# Patient Record
Sex: FEMALE | Race: OTHER | URBAN - METROPOLITAN AREA
[De-identification: names, ages, dates, MRNs, and addresses within clinical notes are randomized per-mention and may not be internally consistent; named-entity substitution may affect disease eponyms.]

---

## 2024-03-06 LAB
ABO, EXTERNAL RESULT: NORMAL
C. TRACHOMATIS, EXTERNAL RESULT: NEGATIVE
HEP B, EXTERNAL RESULT: NEGATIVE
HEPATITIS C ANTIBODY, EXTERNAL RESULT: NORMAL
HIV, EXTERNAL RESULT: NORMAL
N. GONORRHOEAE, EXTERNAL RESULT: NEGATIVE
RH FACTOR, EXTERNAL RESULT: POSITIVE
RPR, EXTERNAL RESULT: NORMAL
RUBELLA TITER, EXTERNAL RESULT: NORMAL

## 2024-05-03 ENCOUNTER — ROUTINE PRENATAL (OUTPATIENT)
Age: 39
End: 2024-05-03
Payer: MEDICAID

## 2024-05-03 VITALS — SYSTOLIC BLOOD PRESSURE: 127 MMHG | DIASTOLIC BLOOD PRESSURE: 76 MMHG | HEART RATE: 84 BPM

## 2024-05-03 DIAGNOSIS — O09.523 AMA (ADVANCED MATERNAL AGE) MULTIGRAVIDA 35+, THIRD TRIMESTER: Primary | ICD-10-CM

## 2024-05-03 DIAGNOSIS — Z3A.34 34 WEEKS GESTATION OF PREGNANCY: ICD-10-CM

## 2024-05-03 DIAGNOSIS — O40.3XX0 POLYHYDRAMNIOS AFFECTING PREGNANCY IN THIRD TRIMESTER: ICD-10-CM

## 2024-05-03 PROCEDURE — 76819 FETAL BIOPHYS PROFIL W/O NST: CPT | Performed by: OBSTETRICS & GYNECOLOGY

## 2024-05-03 PROCEDURE — 99024 POSTOP FOLLOW-UP VISIT: CPT | Performed by: OBSTETRICS & GYNECOLOGY

## 2024-05-03 NOTE — PROCEDURES
PATIENT: STACY ALDRIDGE   -  : 1985   -  DOS:2024   -  INTERPRETING PROVIDER:Palomo Arevalo,   Indication  ========    Advanced Maternal Age, Polyhydramnios. obesity I.    Method  ======    Transabdominal ultrasound examination. View: Sufficient    Pregnancy  =========    Casiano pregnancy. Number of fetuses: 1    Dating  ======    LMP on: 2023  Cycle: regular cycle  GA by LMP 31 w + 4 d  ENDER by LMP: 2024  Assigned: based on ultrasound (AC, BPD, Femur, HC), selected on 2024  Assigned GA 34 w + 2 d  Assigned ENDER: 2024    General Evaluation  ==============    Cardiac activity present.  bpm. Fetal movements: visualized. Presentation: Transverse, head to maternal left  Placenta: Placental site: anterior, appropriate distance from the internal os  Umbilical cord: Cord vessels: 3 vessel cord    Fetal Anatomy  ===========    Face  Mandible: SUBOPTIMAL  4-chamber view: SUBOPTIMAL  RVOT view: SUBOPTIMAL  Heart / Thorax  Ductal arch view: SUBOPTIMAL  Stomach: normal  Kidneys: normal  Bladder: normal  Lt arm: NOT VISUALIZED  Rt hand: NOT VISUALIZED  Rt fingers: NOT VISUALIZED  Lt hand: NOT VISUALIZED  Lt fingers: NOT VISUALIZED  Wants to know fetal sex: yes    Amniotic Fluid Assessment  =====================    Amount of AF: polyhydramnios  MVP 8.8 cm. EMIR 24.7 cm. Q1 7.9 cm, Q2 8.8 cm, Q3 3.7 cm, Q4 4.3 cm    Biophysical Profile  ==============    0: Fetal breathing movements  2: Gross body movements  2: Fetal tone  2: Amniotic fluid volume  NST: reactive  8/10 Biophysical profile score    Non Stress Test  =============    NST interpretation: reactive. Test duration 20 min. Baseline  bpm. Baseline variability: moderate. Accelerations: present. Decelerations: absent. Uterine activity:  absent. Acoustic stimulation: no    Findings  =======    Intrauterine Casiano pregnancy at 34w 2d by clinical dates.  Amniotic fluid is polyhydramnios.  Placenta is anterior, appropriate

## 2024-05-10 ENCOUNTER — ROUTINE PRENATAL (OUTPATIENT)
Age: 39
End: 2024-05-10
Payer: MEDICAID

## 2024-05-10 VITALS — SYSTOLIC BLOOD PRESSURE: 115 MMHG | HEART RATE: 68 BPM | DIASTOLIC BLOOD PRESSURE: 62 MMHG

## 2024-05-10 DIAGNOSIS — O40.3XX0 POLYHYDRAMNIOS, THIRD TRIMESTER, SINGLE GESTATION: ICD-10-CM

## 2024-05-10 DIAGNOSIS — Z75.8 LANGUAGE BARRIER: ICD-10-CM

## 2024-05-10 DIAGNOSIS — Z3A.35 35 WEEKS GESTATION OF PREGNANCY: ICD-10-CM

## 2024-05-10 DIAGNOSIS — O09.523 AMA (ADVANCED MATERNAL AGE) MULTIGRAVIDA 35+, THIRD TRIMESTER: Primary | ICD-10-CM

## 2024-05-10 DIAGNOSIS — O99.213 OBESITY AFFECTING PREGNANCY IN THIRD TRIMESTER, UNSPECIFIED OBESITY TYPE: ICD-10-CM

## 2024-05-10 DIAGNOSIS — Z60.3 LANGUAGE BARRIER: ICD-10-CM

## 2024-05-10 PROCEDURE — 99212 OFFICE O/P EST SF 10 MIN: CPT | Performed by: OBSTETRICS & GYNECOLOGY

## 2024-05-10 PROCEDURE — 76818 FETAL BIOPHYS PROFILE W/NST: CPT | Performed by: OBSTETRICS & GYNECOLOGY

## 2024-05-10 SDOH — SOCIAL STABILITY - SOCIAL INSECURITY: ACCULTURATION DIFFICULTY: Z60.3

## 2024-05-10 NOTE — PROCEDURES
leakage of fluid.    Impression:  Intrauterine pregnancy at 35 weeks and 2 days.  Advanced maternal age.  Maternal obesity.  Mild polyhydramnios.    Recommendations:  Return to your office for obstetrical care and delivery.  See recommendations below.    Patient expressed her understanding of the above. I answered all her questions.  Thank you for allowing us to share in this patient's care. If you have any questions or if I can be of any further assistance to you, please do not hesitate to call.  For this office visit/consultation I spent 10 minutes in the care of this patient.    Throughout this encounter the patient was not wearing a mask; I was wearing a mask. I maintained the recommended social distancing.    Due to the declared Covid-19 pandemic and other respiratory infections, additional supplies, precautions, screenings and staff time over and above those usually included in  an office visit were utilized and required to support the safe in-person provision of services and mitigate transmission of disease.    Parts of this note and ultrasound report were completed using Dragon medical speech recognition software. Grammatical errors, random word insertions, pronoun errors  and incomplete sentences are occasional consequences of voice recognition technology and other factors. If there are any questions or concerns about the content of this  note or information contained in this dictation, please let me know as soon as possible.    Recommendations  ==============    RECOMMENDATIONS:  Continue with weekly biophysical profiles.  GROWTH in 3-4 weeks.  Perform daily fetal kick and movement counts.    Coding  ======    Code: O09.523  Description: Supervision of elderly multigravida  Code: O40.1XXXX  Description: Polyhydramnios  Code: Z3A.35  Description: Weeks of gestation  Code: 61386  Description: Ultrasound, pregnant uterus, real time with image documentation, limited one or more

## 2024-05-17 ENCOUNTER — ROUTINE PRENATAL (OUTPATIENT)
Age: 39
End: 2024-05-17
Payer: COMMERCIAL

## 2024-05-17 VITALS — HEART RATE: 76 BPM | SYSTOLIC BLOOD PRESSURE: 115 MMHG | DIASTOLIC BLOOD PRESSURE: 69 MMHG

## 2024-05-17 DIAGNOSIS — O09.523 AMA (ADVANCED MATERNAL AGE) MULTIGRAVIDA 35+, THIRD TRIMESTER: Primary | ICD-10-CM

## 2024-05-17 PROCEDURE — 76819 FETAL BIOPHYS PROFIL W/O NST: CPT | Performed by: OBSTETRICS & GYNECOLOGY

## 2024-05-17 PROCEDURE — 76816 OB US FOLLOW-UP PER FETUS: CPT | Performed by: OBSTETRICS & GYNECOLOGY

## 2024-05-17 NOTE — PROGRESS NOTES
Verbal teaching on Fetal movement/kick counts, and Pre Eclampsia signs and symptoms provided. Patient verbalized understanding. Opportunity for questions and concerns provided. No additional questions at this time.

## 2024-05-20 NOTE — PROCEDURES
fetal sex: yes    Biophysical Profile  ==============    2: Fetal breathing movements  2: Gross body movements  2: Fetal tone  2: Amniotic fluid volume  8/8 Biophysical profile score    Findings  =======    Viable Casiano pregnancy at 36w 2d by clinical dates.  EFW is 2996 g at 63%, abdominal circumference at 95%.  Anatomy visualized as stated above.  Amniotic fluid is normal.  Placenta is anterior, appropriate distance from the internal os.  Transverse, head to maternal left presentation.    Biophysical profile score is 8/8.    Consultation  ==========    Talya is a 39 y/o  seen for the following:    AMA/Class I Obesity  - 4/3/24: 1 Hr Glucola 45  -Taking ld ASA  -LR NIPT  -Kick count instructions, PIH and PTL precautions reviewed.    Recommendations  ==============    Weekly  surveillance  Interval growth every 4 weeks as scheduled  Perform daily fetal kick and movement counts.    Coding  ======    Code: O09.523  Description: Supervision of elderly multigravida  Code: O40.1XXXX  Description: Polyhydramnios  Code: Z3A.36  Description: Weeks of gestation  Code: 07919  Description: Ultrasound, pregnant uterus, real time with image documentation, follow up, transabdominal approach per fetus  Code: 48310  Description: Fetal biophysical profile; without non-stress testing

## 2024-05-24 ENCOUNTER — ROUTINE PRENATAL (OUTPATIENT)
Age: 39
End: 2024-05-24
Payer: COMMERCIAL

## 2024-05-24 VITALS — HEART RATE: 81 BPM | SYSTOLIC BLOOD PRESSURE: 138 MMHG | DIASTOLIC BLOOD PRESSURE: 73 MMHG

## 2024-05-24 DIAGNOSIS — O32.2XX0 TRANSVERSE LIE OF FETUS, SINGLE OR UNSPECIFIED FETUS: ICD-10-CM

## 2024-05-24 DIAGNOSIS — Z3A.37 37 WEEKS GESTATION OF PREGNANCY: Primary | ICD-10-CM

## 2024-05-24 DIAGNOSIS — O09.523 ELDERLY MULTIGRAVIDA IN THIRD TRIMESTER: ICD-10-CM

## 2024-05-24 PROCEDURE — 76819 FETAL BIOPHYS PROFIL W/O NST: CPT | Performed by: OBSTETRICS & GYNECOLOGY

## 2024-05-24 PROCEDURE — 99024 POSTOP FOLLOW-UP VISIT: CPT | Performed by: OBSTETRICS & GYNECOLOGY

## 2024-05-24 NOTE — PROCEDURES
PATIENT: STACY ALDRIDGE   -  : 1985   -  DOS:2024   -  INTERPRETING PROVIDER:Palomo Arevalo,   Indication  ========    Advanced Maternal Age, obesity I    Method  ======    Transabdominal ultrasound examination. View: Sufficient    Pregnancy  =========    Casiano pregnancy. Number of fetuses: 1    Dating  ======    LMP on: 2023  Cycle: regular cycle  GA by LMP 34 w + 4 d  ENDER by LMP: 2024  Assigned: based on ultrasound (AC, BPD, Femur, HC), selected on 2024  Assigned GA 37 w + 2 d  Assigned ENDER: 2024    General Evaluation  ==============    Cardiac activity present.  bpm. Fetal movements: visualized. Presentation: Transverse, head to maternal left  Placenta: Placental site: anterior, appropriate distance from the internal os  Umbilical cord: Cord vessels: 3 vessel cord    Fetal Anatomy  ===========    Face  Mandible: SUBOPTIMAL  4-chamber view: SUBOPTIMAL  RVOT view: SUBOPTIMAL  Heart / Thorax  Ductal arch view: SUBOPTIMAL  Stomach: normal  Kidneys: normal  Bladder: normal  Lt arm: NOT VISUALIZED  Rt hand: NOT VISUALIZED  Rt fingers: NOT VISUALIZED  Lt hand: NOT VISUALIZED  Lt fingers: NOT VISUALIZED  Wants to know fetal sex: yes    Amniotic Fluid Assessment  =====================    Amount of AF: normal  MVP 6.9 cm. EMIR 16.3 cm. Q1 6.9 cm, Q2 3.4 cm, Q3 4.2 cm, Q4 1.9 cm    Biophysical Profile  ==============    2: Fetal breathing movements  2: Gross body movements  2: Fetal tone  2: Amniotic fluid volume  8/8 Biophysical profile score    Findings  =======    Intrauterine Casiano pregnancy at 37w 2d by clinical dates.  Amniotic fluid: normal.  Placenta is anterior, appropriate distance from the internal os.  Biophysical profile score is 8/8.  Transverse, head to maternal left presentation.    Consultation  ==========    Talya is a 39 y/o  seen for the following:    AMA/Class I Obesity  - 4/3/24: 1 Hr Glucola 45  -Taking LDASA  -LR NIPT  -Kick count

## 2024-05-29 ENCOUNTER — HOSPITAL ENCOUNTER (OUTPATIENT)
Facility: HOSPITAL | Age: 39
Discharge: HOME | End: 2024-05-29
Attending: OBSTETRICS & GYNECOLOGY | Admitting: OBSTETRICS & GYNECOLOGY
Payer: COMMERCIAL

## 2024-05-29 VITALS
DIASTOLIC BLOOD PRESSURE: 71 MMHG | HEART RATE: 81 BPM | OXYGEN SATURATION: 100 % | RESPIRATION RATE: 16 BRPM | TEMPERATURE: 98 F | SYSTOLIC BLOOD PRESSURE: 129 MMHG

## 2024-05-29 LAB
ABO + RH BLD: NORMAL
BLOOD GROUP ANTIBODIES SERPL: NORMAL
ERYTHROCYTE [DISTWIDTH] IN BLOOD BY AUTOMATED COUNT: 17.2 % (ref 11.5–14.5)
HCT VFR BLD AUTO: 39.5 % (ref 35–47)
HGB BLD-MCNC: 12.9 G/DL (ref 11.5–16)
MCH RBC QN AUTO: 27 PG (ref 26–34)
MCHC RBC AUTO-ENTMCNC: 32.7 G/DL (ref 30–36.5)
MCV RBC AUTO: 82.8 FL (ref 80–99)
NRBC # BLD: 0 K/UL (ref 0–0.01)
NRBC BLD-RTO: 0 PER 100 WBC
PLATELET # BLD AUTO: 323 K/UL (ref 150–400)
PMV BLD AUTO: 10.2 FL (ref 8.9–12.9)
RBC # BLD AUTO: 4.77 M/UL (ref 3.8–5.2)
RPR SER QL: NONREACTIVE
SPECIMEN EXP DATE BLD: NORMAL
WBC # BLD AUTO: 6.9 K/UL (ref 3.6–11)

## 2024-05-29 PROCEDURE — 36415 COLL VENOUS BLD VENIPUNCTURE: CPT

## 2024-05-29 PROCEDURE — 86900 BLOOD TYPING SEROLOGIC ABO: CPT

## 2024-05-29 PROCEDURE — 1100000000 HC RM PRIVATE

## 2024-05-29 PROCEDURE — 86592 SYPHILIS TEST NON-TREP QUAL: CPT

## 2024-05-29 PROCEDURE — 86901 BLOOD TYPING SEROLOGIC RH(D): CPT

## 2024-05-29 PROCEDURE — 86850 RBC ANTIBODY SCREEN: CPT

## 2024-05-29 PROCEDURE — 85027 COMPLETE CBC AUTOMATED: CPT

## 2024-05-29 RX ORDER — TERBUTALINE SULFATE 1 MG/ML
0.25 INJECTION, SOLUTION SUBCUTANEOUS ONCE
Status: DISCONTINUED | OUTPATIENT
Start: 2024-05-29 | End: 2024-05-29 | Stop reason: HOSPADM

## 2024-05-29 NOTE — H&P
History & Physical    Name: Talya Hayes MRN: [id number]  SSN: [id number]    YOB: 1985  Age: 39 y.o.  Sex: female      Subjective:     Reason for Admission:  Pregnancy and Breech    History of Present Illness: Talya Hayes is a 39 y.o.  female with an estimated gestational age of 38w0d with Estimated Date of Delivery: 24. Patient complains of  Breech Presentation  . Pregnancy has been complicated by advanced maternal age, polyhydramnios, and Breech Presentation .  Patient denies abdominal pain  , chest pain, contractions, fever, headache , nausea and vomiting, pelvic pressure, right upper quadrant pain  , shortness of breath, swelling, vaginal bleeding , vaginal leaking of fluid , and visual disturbances.    OB History          3    Para   1    Term   1            AB   1    Living   1         SAB   1    IAB        Ectopic        Molar        Multiple        Live Births   1              Past Medical History:   Diagnosis Date    AMA (advanced maternal age) multigravida 35+     Polyhydramnios      No past surgical history on file.  Social History     Occupational History    Not on file   Tobacco Use    Smoking status: Never     Passive exposure: Never    Smokeless tobacco: Never   Substance and Sexual Activity    Alcohol use: Not Currently    Drug use: Not on file    Sexual activity: Not on file     No family history on file.    No Known Allergies  Prior to Admission medications    Medication Sig Start Date End Date Taking? Authorizing Provider   FEROSUL 325 (65 Fe) MG tablet Take 1 tablet by mouth daily 3/12/24   Phil Bearden MD   Prenatal Vit w/Sf-Qetitytvb-ON (PNV PO) Take by mouth    Phil Bearden MD   aspirin 81 MG EC tablet Take 1 tablet by mouth daily    Phil Bearden MD        Review of Systems  No contractions  No Loss of amniotic fluid  No Vaginal Bleeding  Positive Fetal Movements    Objective:     Vitals:    Vitals:

## 2024-05-29 NOTE — CONSULTS
1040-pt arrived for scheduled version. Pt endorses +fetal movement and no leaking of fluid or bleeding.    1155-Dr Horowitz at bedside    1202-US confirmed vertex, plan to dc and follow up in cross over clinic at scheduled. All questions answered and dc instructions given    Session ID: [id number]  Language: Tamazight   ID: #[id number]   Name: Alba

## 2024-05-29 NOTE — CONSULTS
1040-pt arrived for scheduled version. Pt endorses +fetal movement and no leaking of fluid or bleeding.    1155-Dr Horowitz at bedside    1202-US confirmed vertex, plan to dc and follow up in cross over clinic at scheduled. All questions answered and dc instructions given      Session ID: [id number]  Language: Japanese   ID: #[id number]   Name: Alba

## 2024-05-31 ENCOUNTER — ROUTINE PRENATAL (OUTPATIENT)
Age: 39
End: 2024-05-31
Payer: COMMERCIAL

## 2024-05-31 VITALS — DIASTOLIC BLOOD PRESSURE: 81 MMHG | SYSTOLIC BLOOD PRESSURE: 139 MMHG | HEART RATE: 51 BPM

## 2024-05-31 DIAGNOSIS — O09.523 ELDERLY MULTIGRAVIDA IN THIRD TRIMESTER: Primary | ICD-10-CM

## 2024-05-31 PROCEDURE — 99212 OFFICE O/P EST SF 10 MIN: CPT | Performed by: OBSTETRICS & GYNECOLOGY

## 2024-05-31 PROCEDURE — 76819 FETAL BIOPHYS PROFIL W/O NST: CPT | Performed by: OBSTETRICS & GYNECOLOGY

## 2024-05-31 NOTE — PROCEDURES
to ob who can perform ECV vs c/s    2) AMA (age 39)/Class I Obesity  - 4/3/24: 1 Hr Glucola 45  -Taking LDASA  -LR NIPT  -Kick count instructions, PIH and PTL precautions reviewed.    Patient was counseled on the findings. We discussed my recommendation to deliver at 39 weeks. Questions and concerns were addressed.    Excluding time reading the ultrasound, a total of 15 minutes was spent on this visit reviewing previous notes, counseling the patient and documenting the findings in the  note.    Recommendations  ==============    ECV vs c/s at 39 weeks    Coding  ======    Code: O09.523  Description: Supervision of elderly multigravida  Code: O32.2XX0  Description: Maternal care for transverse and oblique lie  Code: Z3A.38  Description: Weeks of gestation  Code: 38021  Description: Fetal biophysical profile; without non-stress testing

## 2024-06-07 ENCOUNTER — HOSPITAL ENCOUNTER (INPATIENT)
Facility: HOSPITAL | Age: 39
LOS: 2 days | Discharge: HOME | DRG: 540 | End: 2024-06-09
Attending: FAMILY MEDICINE | Admitting: FAMILY MEDICINE
Payer: COMMERCIAL

## 2024-06-07 ENCOUNTER — ANESTHESIA EVENT (OUTPATIENT)
Facility: HOSPITAL | Age: 39
End: 2024-06-07
Payer: COMMERCIAL

## 2024-06-07 ENCOUNTER — ANESTHESIA (OUTPATIENT)
Facility: HOSPITAL | Age: 39
End: 2024-06-07
Payer: COMMERCIAL

## 2024-06-07 PROBLEM — Z3A.39 39 WEEKS GESTATION OF PREGNANCY: Status: ACTIVE | Noted: 2024-06-07

## 2024-06-07 LAB
ABO + RH BLD: NORMAL
BLOOD GROUP ANTIBODIES SERPL: NORMAL
ERYTHROCYTE [DISTWIDTH] IN BLOOD BY AUTOMATED COUNT: 16.5 % (ref 11.5–14.5)
HCT VFR BLD AUTO: 37.4 % (ref 35–47)
HGB BLD-MCNC: 12.2 G/DL (ref 11.5–16)
MCH RBC QN AUTO: 27.1 PG (ref 26–34)
MCHC RBC AUTO-ENTMCNC: 32.6 G/DL (ref 30–36.5)
MCV RBC AUTO: 83.1 FL (ref 80–99)
NRBC # BLD: 0 K/UL (ref 0–0.01)
NRBC BLD-RTO: 0 PER 100 WBC
PLATELET # BLD AUTO: 300 K/UL (ref 150–400)
PMV BLD AUTO: 10.4 FL (ref 8.9–12.9)
RBC # BLD AUTO: 4.5 M/UL (ref 3.8–5.2)
SPECIMEN EXP DATE BLD: NORMAL
WBC # BLD AUTO: 7.7 K/UL (ref 3.6–11)

## 2024-06-07 PROCEDURE — 2500000003 HC RX 250 WO HCPCS: Performed by: NURSE ANESTHETIST, CERTIFIED REGISTERED

## 2024-06-07 PROCEDURE — 6360000002 HC RX W HCPCS: Performed by: NURSE ANESTHETIST, CERTIFIED REGISTERED

## 2024-06-07 PROCEDURE — 2580000003 HC RX 258

## 2024-06-07 PROCEDURE — 3609079900 HC CESAREAN SECTION: Performed by: OBSTETRICS & GYNECOLOGY

## 2024-06-07 PROCEDURE — 1120000000 HC RM PRIVATE OB

## 2024-06-07 PROCEDURE — 2709999900 HC NON-CHARGEABLE SUPPLY: Performed by: OBSTETRICS & GYNECOLOGY

## 2024-06-07 PROCEDURE — 86900 BLOOD TYPING SEROLOGIC ABO: CPT

## 2024-06-07 PROCEDURE — 4A1HXCZ MONITORING OF PRODUCTS OF CONCEPTION, CARDIAC RATE, EXTERNAL APPROACH: ICD-10-PCS | Performed by: OBSTETRICS & GYNECOLOGY

## 2024-06-07 PROCEDURE — 36415 COLL VENOUS BLD VENIPUNCTURE: CPT

## 2024-06-07 PROCEDURE — 59514 CESAREAN DELIVERY ONLY: CPT | Performed by: OBSTETRICS & GYNECOLOGY

## 2024-06-07 PROCEDURE — 3700000001 HC ADD 15 MINUTES (ANESTHESIA): Performed by: OBSTETRICS & GYNECOLOGY

## 2024-06-07 PROCEDURE — 3700000000 HC ANESTHESIA ATTENDED CARE: Performed by: OBSTETRICS & GYNECOLOGY

## 2024-06-07 PROCEDURE — 6360000002 HC RX W HCPCS

## 2024-06-07 PROCEDURE — 88307 TISSUE EXAM BY PATHOLOGIST: CPT

## 2024-06-07 PROCEDURE — 7100000000 HC PACU RECOVERY - FIRST 15 MIN: Performed by: OBSTETRICS & GYNECOLOGY

## 2024-06-07 PROCEDURE — 85027 COMPLETE CBC AUTOMATED: CPT

## 2024-06-07 PROCEDURE — 86901 BLOOD TYPING SEROLOGIC RH(D): CPT

## 2024-06-07 PROCEDURE — 7100000001 HC PACU RECOVERY - ADDTL 15 MIN: Performed by: OBSTETRICS & GYNECOLOGY

## 2024-06-07 PROCEDURE — 86780 TREPONEMA PALLIDUM: CPT

## 2024-06-07 PROCEDURE — 86850 RBC ANTIBODY SCREEN: CPT

## 2024-06-07 RX ORDER — SODIUM CHLORIDE 0.9 % (FLUSH) 0.9 %
5-40 SYRINGE (ML) INJECTION PRN
Status: DISCONTINUED | OUTPATIENT
Start: 2024-06-07 | End: 2024-06-09 | Stop reason: HOSPADM

## 2024-06-07 RX ORDER — SODIUM CHLORIDE 9 MG/ML
INJECTION, SOLUTION INTRAVENOUS PRN
Status: DISCONTINUED | OUTPATIENT
Start: 2024-06-07 | End: 2024-06-07

## 2024-06-07 RX ORDER — SODIUM CHLORIDE, SODIUM LACTATE, POTASSIUM CHLORIDE, AND CALCIUM CHLORIDE .6; .31; .03; .02 G/100ML; G/100ML; G/100ML; G/100ML
1000 INJECTION, SOLUTION INTRAVENOUS ONCE
Status: DISCONTINUED | OUTPATIENT
Start: 2024-06-07 | End: 2024-06-09 | Stop reason: HOSPADM

## 2024-06-07 RX ORDER — FAMOTIDINE 10 MG/ML
INJECTION, SOLUTION INTRAVENOUS PRN
Status: DISCONTINUED | OUTPATIENT
Start: 2024-06-07 | End: 2024-06-07 | Stop reason: SDUPTHER

## 2024-06-07 RX ORDER — OXYCODONE HYDROCHLORIDE 5 MG/1
5 TABLET ORAL EVERY 4 HOURS PRN
Status: DISCONTINUED | OUTPATIENT
Start: 2024-06-07 | End: 2024-06-09 | Stop reason: HOSPADM

## 2024-06-07 RX ORDER — BUPIVACAINE HYDROCHLORIDE 7.5 MG/ML
INJECTION, SOLUTION EPIDURAL; RETROBULBAR PRN
Status: DISCONTINUED | OUTPATIENT
Start: 2024-06-07 | End: 2024-06-07 | Stop reason: SDUPTHER

## 2024-06-07 RX ORDER — SODIUM CHLORIDE 9 MG/ML
INJECTION, SOLUTION INTRAVENOUS PRN
Status: DISCONTINUED | OUTPATIENT
Start: 2024-06-07 | End: 2024-06-09 | Stop reason: HOSPADM

## 2024-06-07 RX ORDER — OXYTOCIN 10 [USP'U]/ML
INJECTION, SOLUTION INTRAMUSCULAR; INTRAVENOUS PRN
Status: DISCONTINUED | OUTPATIENT
Start: 2024-06-07 | End: 2024-06-07 | Stop reason: SDUPTHER

## 2024-06-07 RX ORDER — KETOROLAC TROMETHAMINE 30 MG/ML
15 INJECTION, SOLUTION INTRAMUSCULAR; INTRAVENOUS EVERY 6 HOURS PRN
Status: DISCONTINUED | OUTPATIENT
Start: 2024-06-07 | End: 2024-06-07

## 2024-06-07 RX ORDER — ACETAMINOPHEN 500 MG
1000 TABLET ORAL EVERY 8 HOURS
Status: DISCONTINUED | OUTPATIENT
Start: 2024-06-07 | End: 2024-06-09 | Stop reason: HOSPADM

## 2024-06-07 RX ORDER — FERROUS SULFATE 325(65) MG
325 TABLET ORAL EVERY OTHER DAY
Status: DISCONTINUED | OUTPATIENT
Start: 2024-06-07 | End: 2024-06-09 | Stop reason: HOSPADM

## 2024-06-07 RX ORDER — SWAB
1 SWAB, NON-MEDICATED MISCELLANEOUS DAILY
Status: DISCONTINUED | OUTPATIENT
Start: 2024-06-07 | End: 2024-06-09 | Stop reason: HOSPADM

## 2024-06-07 RX ORDER — ONDANSETRON 2 MG/ML
INJECTION INTRAMUSCULAR; INTRAVENOUS PRN
Status: DISCONTINUED | OUTPATIENT
Start: 2024-06-07 | End: 2024-06-07 | Stop reason: SDUPTHER

## 2024-06-07 RX ORDER — SODIUM CHLORIDE, SODIUM LACTATE, POTASSIUM CHLORIDE, CALCIUM CHLORIDE 600; 310; 30; 20 MG/100ML; MG/100ML; MG/100ML; MG/100ML
INJECTION, SOLUTION INTRAVENOUS CONTINUOUS
Status: DISCONTINUED | OUTPATIENT
Start: 2024-06-07 | End: 2024-06-09 | Stop reason: HOSPADM

## 2024-06-07 RX ORDER — IBUPROFEN 800 MG/1
800 TABLET ORAL EVERY 8 HOURS
Status: DISCONTINUED | OUTPATIENT
Start: 2024-06-07 | End: 2024-06-09 | Stop reason: HOSPADM

## 2024-06-07 RX ORDER — SODIUM CHLORIDE 0.9 % (FLUSH) 0.9 %
10 SYRINGE (ML) INJECTION PRN
Status: DISCONTINUED | OUTPATIENT
Start: 2024-06-07 | End: 2024-06-07

## 2024-06-07 RX ORDER — KETOROLAC TROMETHAMINE 30 MG/ML
30 INJECTION, SOLUTION INTRAMUSCULAR; INTRAVENOUS EVERY 6 HOURS PRN
Status: DISCONTINUED | OUTPATIENT
Start: 2024-06-07 | End: 2024-06-09 | Stop reason: HOSPADM

## 2024-06-07 RX ORDER — SODIUM CHLORIDE 0.9 % (FLUSH) 0.9 %
5-40 SYRINGE (ML) INJECTION EVERY 12 HOURS SCHEDULED
Status: DISCONTINUED | OUTPATIENT
Start: 2024-06-07 | End: 2024-06-07

## 2024-06-07 RX ORDER — DOCUSATE SODIUM 100 MG/1
100 CAPSULE, LIQUID FILLED ORAL 2 TIMES DAILY
Status: DISCONTINUED | OUTPATIENT
Start: 2024-06-07 | End: 2024-06-09 | Stop reason: HOSPADM

## 2024-06-07 RX ORDER — MORPHINE SULFATE 1 MG/ML
INJECTION, SOLUTION EPIDURAL; INTRATHECAL; INTRAVENOUS PRN
Status: DISCONTINUED | OUTPATIENT
Start: 2024-06-07 | End: 2024-06-07 | Stop reason: SDUPTHER

## 2024-06-07 RX ORDER — ONDANSETRON 2 MG/ML
4 INJECTION INTRAMUSCULAR; INTRAVENOUS EVERY 6 HOURS PRN
Status: DISCONTINUED | OUTPATIENT
Start: 2024-06-07 | End: 2024-06-07

## 2024-06-07 RX ORDER — PROCHLORPERAZINE EDISYLATE 5 MG/ML
5 INJECTION INTRAMUSCULAR; INTRAVENOUS EVERY 6 HOURS PRN
Status: DISCONTINUED | OUTPATIENT
Start: 2024-06-07 | End: 2024-06-09 | Stop reason: HOSPADM

## 2024-06-07 RX ORDER — EPHEDRINE SULFATE/0.9% NACL/PF 25 MG/5 ML
SYRINGE (ML) INTRAVENOUS PRN
Status: DISCONTINUED | OUTPATIENT
Start: 2024-06-07 | End: 2024-06-07 | Stop reason: SDUPTHER

## 2024-06-07 RX ORDER — ACETAMINOPHEN 325 MG/1
975 TABLET ORAL ONCE
Status: DISCONTINUED | OUTPATIENT
Start: 2024-06-07 | End: 2024-06-07

## 2024-06-07 RX ORDER — PROCHLORPERAZINE EDISYLATE 5 MG/ML
10 INJECTION INTRAMUSCULAR; INTRAVENOUS EVERY 6 HOURS PRN
Status: DISCONTINUED | OUTPATIENT
Start: 2024-06-07 | End: 2024-06-07

## 2024-06-07 RX ORDER — LANOLIN/MINERAL OIL
LOTION (ML) TOPICAL
Status: DISCONTINUED | OUTPATIENT
Start: 2024-06-07 | End: 2024-06-09 | Stop reason: HOSPADM

## 2024-06-07 RX ORDER — SODIUM CHLORIDE 0.9 % (FLUSH) 0.9 %
5-40 SYRINGE (ML) INJECTION EVERY 12 HOURS SCHEDULED
Status: DISCONTINUED | OUTPATIENT
Start: 2024-06-07 | End: 2024-06-09 | Stop reason: HOSPADM

## 2024-06-07 RX ADMIN — SODIUM CHLORIDE, POTASSIUM CHLORIDE, SODIUM LACTATE AND CALCIUM CHLORIDE: 600; 310; 30; 20 INJECTION, SOLUTION INTRAVENOUS at 17:17

## 2024-06-07 RX ADMIN — BUPIVACAINE HYDROCHLORIDE 1 ML: 7.5 INJECTION, SOLUTION EPIDURAL; RETROBULBAR at 14:29

## 2024-06-07 RX ADMIN — OXYTOCIN 30 UNITS: 10 INJECTION INTRAVENOUS at 14:55

## 2024-06-07 RX ADMIN — FAMOTIDINE 20 MG: 10 INJECTION, SOLUTION INTRAVENOUS at 14:24

## 2024-06-07 RX ADMIN — KETOROLAC TROMETHAMINE 30 MG: 30 INJECTION, SOLUTION INTRAMUSCULAR at 20:56

## 2024-06-07 RX ADMIN — MORPHINE SULFATE 0.3 MG: 1 INJECTION, SOLUTION EPIDURAL; INTRATHECAL; INTRAVENOUS at 14:29

## 2024-06-07 RX ADMIN — ONDANSETRON 4 MG: 2 INJECTION INTRAMUSCULAR; INTRAVENOUS at 14:24

## 2024-06-07 RX ADMIN — CEFAZOLIN SODIUM 2000 MG: 1 POWDER, FOR SOLUTION INTRAMUSCULAR; INTRAVENOUS at 14:35

## 2024-06-07 RX ADMIN — EPHEDRINE SULFATE 12.5 MG: 5 INJECTION INTRAVENOUS at 14:39

## 2024-06-07 RX ADMIN — PROCHLORPERAZINE EDISYLATE 5 MG: 5 INJECTION INTRAMUSCULAR; INTRAVENOUS at 16:50

## 2024-06-07 RX ADMIN — SODIUM CHLORIDE, POTASSIUM CHLORIDE, SODIUM LACTATE AND CALCIUM CHLORIDE: 600; 310; 30; 20 INJECTION, SOLUTION INTRAVENOUS at 18:23

## 2024-06-07 RX ADMIN — EPHEDRINE SULFATE 12.5 MG: 5 INJECTION INTRAVENOUS at 15:08

## 2024-06-07 RX ADMIN — EPHEDRINE SULFATE 12.5 MG: 5 INJECTION INTRAVENOUS at 14:43

## 2024-06-07 RX ADMIN — SODIUM CHLORIDE, POTASSIUM CHLORIDE, SODIUM LACTATE AND CALCIUM CHLORIDE: 600; 310; 30; 20 INJECTION, SOLUTION INTRAVENOUS at 14:57

## 2024-06-07 RX ADMIN — SODIUM CHLORIDE, POTASSIUM CHLORIDE, SODIUM LACTATE AND CALCIUM CHLORIDE: 600; 310; 30; 20 INJECTION, SOLUTION INTRAVENOUS at 14:20

## 2024-06-07 ASSESSMENT — PAIN DESCRIPTION - DESCRIPTORS: DESCRIPTORS: ACHING;SORE

## 2024-06-07 ASSESSMENT — PAIN SCALES - GENERAL: PAINLEVEL_OUTOF10: 5

## 2024-06-07 ASSESSMENT — PAIN DESCRIPTION - LOCATION: LOCATION: ABDOMEN;INCISION

## 2024-06-07 ASSESSMENT — PAIN - FUNCTIONAL ASSESSMENT: PAIN_FUNCTIONAL_ASSESSMENT: ACTIVITIES ARE NOT PREVENTED

## 2024-06-07 ASSESSMENT — PAIN DESCRIPTION - ORIENTATION: ORIENTATION: LOWER

## 2024-06-07 NOTE — PROGRESS NOTES
[id number] Patient arrived on unit for 12pm scheduled c/s.   services used for admission and assessment.  Denies LOF or vaginal bleeding.  EFM applied.Endorses +FM. IV started, labs drawn.  Consents obtained.  This  delayed due to urgent c/s.  Patient updated.  Will continue to monitor.

## 2024-06-07 NOTE — L&D DELIVERY NOTE
Raheem Taylor [[id number]]      Labor Events     Labor: No   Steroids: None  Cervical Ripening Date/Time:      Antibiotics Received during Labor: No  Rupture Identifier: Sac 1  Rupture Date/Time:  24 14:54:00   Rupture Type: Intact, AROM  Fluid Color: Clear  Fluid Odor: None  Fluid Volume: Large  Induction: None  Augmentation: None  Labor Complications: None              Anesthesia    Method: Spinal       Labor Length    3rd stage: 0h 02m       Delivery Details      Delivery Date: 24 Delivery Time: 14:54:00   Delivery Type: , Low Transverse  Trial of Labor?: No   Categorization: Primary   Priority: scheduled  Indications for : Breech       Skin Incision Type: Transverse  Uterine Incision: Low Transverse       [place] Presentation    Presentation: Breech       Shoulder Dystocia    Shoulder Dystocia Present?: No       Assisted Delivery Details    Forceps Attempted?: No  Vacuum Extractor Attempted?: No                           Cord    Vessels: 3 Vessels  Complications: None  Delayed Cord Clamping?: Yes  Cord Clamped Date/Time: 2024 14:54:00  Cord Blood Disposition: Lab  Gases Sent?: No              Placenta    Date/Time: 2024 14:56:00  Removal: Expressed  Appearance: Abnormal  Disposition: Lab       Lacerations    Episiotomy: None  Perineal Lacerations: None  Other Lacerations: no non-perineal laceration       Blood Loss  Mother: Talya Taylor R #[id number]     Start of Mother's Information      Delivery Blood Loss  24 1422 - 24 1542      Quantitative Blood Loss (mL) Hospital Encounter 840 grams    Total  840 mL               End of Mother's Information  Mother: Talya Taylor R #[id number]                Delivery Providers    Delivering clinician: Ivonne Galicia MD     Provider Role    Ivonne Galicia MD Obstetrician    Rosette Alcaraz RN Primary Nurse    Manuela Latif RN Primary  Nurse    Lawrence Mansfield

## 2024-06-07 NOTE — ANESTHESIA PROCEDURE NOTES
Spinal Block    End time: 6/7/2024 12:30 PM  Reason for block: primary anesthetic  Staffing  Performed: resident/CRNA   Anesthesiologist: Anirudh Pimentel MD  Resident/CRNA: Lawrence Mansfield APRN - CRNA  Performed by: Lawrence Mansfield APRN - CRNA  Authorized by: Anirudh Pimentel MD    Spinal Block  Patient position: sitting  Prep: DuraPrep  Patient monitoring: cardiac monitor, continuous pulse ox, frequent blood pressure checks and oxygen  Approach: midline  Location: L3/L4  Provider prep: mask and sterile gloves  Local infiltration: lidocaine  Needle  Needle type: pencil-tip   Needle gauge: 25 G  Needle length: 3.5 in  Assessment  Swirl obtained: Yes  CSF: clear  Attempts: 1  Hemodynamics: stable  Preanesthetic Checklist  Completed: patient identified, IV checked, site marked, risks and benefits discussed, surgical/procedural consents, equipment checked, pre-op evaluation, timeout performed, anesthesia consent given, oxygen available, monitors applied/VS acknowledged, fire risk safety assessment completed and verbalized and blood product R/B/A discussed and consented

## 2024-06-07 NOTE — H&P
Noted EMIR 29.2cm on 4/26. EMIR 13.9 on most recent MFM scan 5/31.    AMA/Obesity: Passed 1hr GTT. On ASA throughout pregnancy.    Hx of miscarriage: second gestation, at 8 weeks. Had D&C.     Patient will be discussed with Dr. Galicia (OB-GYN attending)    Kaylee Abreu MD  Family Medicine Resident

## 2024-06-07 NOTE — OP NOTE
Operative Note    Name: Talya Hayes   Medical Record Number: [id number]   YOB: 1985  Today's Date: 2024      Pre-operative Diagnosis:breech presentation 39 weeks    Post-operative Diagnosis: TLBMI    Operation: low transverse  section Procedure(s):   SECTION    Surgeon: Ivonne Galicia MD    Anesthesia: Spinal    EBL: 750cc    Prophylactic Antibiotics: Ancef  DVT Prophylaxis: Sequential Compression Devices         Fetal Description: joshi     Birth Information:   Information for the patient's :  Raheem Taylor [[id number]]   @[id number]@     Umbilical Cord: 3 vessels present    Placenta:  manual removal    Specimens: placenta           Complications:  none    Implants: none    Surgical Assistant: Robles Mckinney  Scrub Person First: Devi Jorgensen      Procedure Detail:      After proper patient identification and consent, the patient was taken to the operating room, where spinal anesthesia was administered and found to be adequate. Hancock catheter had been placed using sterile technique.  The patient was prepped and draped in the normal sterile fashion.The abdomen was entered using the Pfannenstiel technique. The peritoneum was entered bluntely well superior to the bladder without any apparent injury. An Kenneth retractor was placed.  Palpation revealed no bowel below the retractor. The bladder flap was created without difficulty. A low transverse uterine incision was made with the scalpel and extended with blunt finger dissection. Amniotomy was performed and the fluid was medium amount clear.  The baby was footling breech. The feet were grasped and the baby delivered by breech extraction.  The nose and mouth were suctioned. The cord was clamped and cut and the baby was handed off to Nursing staff in attendance. The placenta was manually extracted. The uterus was wiped clean with a moist lap pad and cleared of all clots and debris. The

## 2024-06-07 NOTE — ANESTHESIA PRE PROCEDURE
BMI:   Wt Readings from Last 3 Encounters:   03/19/24 66.2 kg (145 lb 14.4 oz)     There is no height or weight on file to calculate BMI.    CBC:   Lab Results   Component Value Date/Time    WBC 7.7 06/07/2024 10:46 AM    RBC 4.50 06/07/2024 10:46 AM    HGB 12.2 06/07/2024 10:46 AM    HCT 37.4 06/07/2024 10:46 AM    MCV 83.1 06/07/2024 10:46 AM    RDW 16.5 06/07/2024 10:46 AM     06/07/2024 10:46 AM       CMP: No results found for: \"NA\", \"K\", \"CL\", \"CO2\", \"BUN\", \"CREATININE\", \"GFRAA\", \"AGRATIO\", \"LABGLOM\", \"GLUCOSE\", \"GLU\", \"PROT\", \"CALCIUM\", \"BILITOT\", \"ALKPHOS\", \"AST\", \"ALT\"    POC Tests: No results for input(s): \"POCGLU\", \"POCNA\", \"POCK\", \"POCCL\", \"POCBUN\", \"POCHEMO\", \"POCHCT\" in the last 72 hours.    Coags: No results found for: \"PROTIME\", \"INR\", \"APTT\"    HCG (If Applicable): No results found for: \"PREGTESTUR\", \"PREGSERUM\", \"HCG\", \"HCGQUANT\"     ABGs: No results found for: \"PHART\", \"PO2ART\", \"INT9WWB\", \"ZQT3MBU\", \"BEART\", \"U4EETMLU\"     Type & Screen (If Applicable):  No results found for: \"LABABO\"    Drug/Infectious Status (If Applicable):  No results found for: \"HIV\", \"HEPCAB\"    COVID-19 Screening (If Applicable): No results found for: \"COVID19\"        Anesthesia Evaluation  Patient summary reviewed and Nursing notes reviewed  Airway: Mallampati: II  TM distance: >3 FB   Neck ROM: full  Mouth opening: > = 3 FB   Dental: normal exam         Pulmonary:Negative Pulmonary ROS and normal exam  breath sounds clear to auscultation                             Cardiovascular:Negative CV ROS  Exercise tolerance: good (>4 METS)          Rhythm: regular  Rate: normal                    Neuro/Psych:   Negative Neuro/Psych ROS              GI/Hepatic/Renal: Neg GI/Hepatic/Renal ROS            Endo/Other: Negative Endo/Other ROS                    Abdominal:             Vascular: negative vascular ROS.         Other Findings:             Anesthesia

## 2024-06-07 NOTE — ANESTHESIA POSTPROCEDURE EVALUATION
Department of Anesthesiology  Postprocedure Note    Patient: Talya Hayes  MRN: [id number]  YOB: 1985  Date of evaluation: 2024    Procedure Summary       Date: 24 Room / Location: [facility] L&D  [facility] L&D OR    Anesthesia Start: 1422 Anesthesia Stop: 152    Procedure:  SECTION (Abdomen) Diagnosis:       Spontaneous breech delivery, single or unspecified fetus      (Spontaneous breech delivery, single or unspecified fetus [O32.1XX0])    Surgeons: Ivonne Gailcia MD Responsible Provider: Anirudh Pimentel MD    Anesthesia Type: Spinal ASA Status: 2            Anesthesia Type: Spinal    Maris Phase I: Maris Score: 9    Maris Phase II:      Anesthesia Post Evaluation    No notable events documented.

## 2024-06-08 LAB
ERYTHROCYTE [DISTWIDTH] IN BLOOD BY AUTOMATED COUNT: 15.9 % (ref 11.5–14.5)
HCT VFR BLD AUTO: 31.3 % (ref 35–47)
HGB BLD-MCNC: 10.4 G/DL (ref 11.5–16)
MCH RBC QN AUTO: 27.4 PG (ref 26–34)
MCHC RBC AUTO-ENTMCNC: 33.2 G/DL (ref 30–36.5)
MCV RBC AUTO: 82.6 FL (ref 80–99)
NRBC # BLD: 0 K/UL (ref 0–0.01)
NRBC BLD-RTO: 0 PER 100 WBC
PLATELET # BLD AUTO: 301 K/UL (ref 150–400)
PMV BLD AUTO: 10.2 FL (ref 8.9–12.9)
RBC # BLD AUTO: 3.79 M/UL (ref 3.8–5.2)
WBC # BLD AUTO: 8.5 K/UL (ref 3.6–11)

## 2024-06-08 PROCEDURE — 1120000000 HC RM PRIVATE OB

## 2024-06-08 PROCEDURE — 94761 N-INVAS EAR/PLS OXIMETRY MLT: CPT

## 2024-06-08 PROCEDURE — 6360000002 HC RX W HCPCS

## 2024-06-08 PROCEDURE — 36415 COLL VENOUS BLD VENIPUNCTURE: CPT

## 2024-06-08 PROCEDURE — 6370000000 HC RX 637 (ALT 250 FOR IP)

## 2024-06-08 PROCEDURE — 99232 SBSQ HOSP IP/OBS MODERATE 35: CPT | Performed by: FAMILY MEDICINE

## 2024-06-08 PROCEDURE — 85027 COMPLETE CBC AUTOMATED: CPT

## 2024-06-08 RX ADMIN — IBUPROFEN 800 MG: 800 TABLET, FILM COATED ORAL at 20:53

## 2024-06-08 RX ADMIN — KETOROLAC TROMETHAMINE 30 MG: 30 INJECTION, SOLUTION INTRAMUSCULAR at 03:49

## 2024-06-08 RX ADMIN — DOCUSATE SODIUM 100 MG: 100 CAPSULE, LIQUID FILLED ORAL at 12:32

## 2024-06-08 RX ADMIN — DOCUSATE SODIUM 100 MG: 100 CAPSULE, LIQUID FILLED ORAL at 20:53

## 2024-06-08 RX ADMIN — IBUPROFEN 800 MG: 800 TABLET, FILM COATED ORAL at 12:29

## 2024-06-08 RX ADMIN — Medication 1 TABLET: at 12:32

## 2024-06-08 ASSESSMENT — PAIN DESCRIPTION - DESCRIPTORS
DESCRIPTORS: ACHING
DESCRIPTORS: ACHING;SORE

## 2024-06-08 ASSESSMENT — PAIN DESCRIPTION - LOCATION
LOCATION: ABDOMEN;INCISION
LOCATION: INCISION
LOCATION: ABDOMEN

## 2024-06-08 ASSESSMENT — PAIN DESCRIPTION - ORIENTATION
ORIENTATION: RIGHT
ORIENTATION: LOWER

## 2024-06-08 ASSESSMENT — PAIN SCALES - GENERAL
PAINLEVEL_OUTOF10: 2
PAINLEVEL_OUTOF10: 4
PAINLEVEL_OUTOF10: 2

## 2024-06-08 ASSESSMENT — PAIN - FUNCTIONAL ASSESSMENT: PAIN_FUNCTIONAL_ASSESSMENT: ACTIVITIES ARE NOT PREVENTED

## 2024-06-08 NOTE — PROGRESS NOTES
[address] [id number]   Office [phone], Fax [phone]    Post-Operative Day Number 1 Progress Note    Patient doing well on POD 1 after primary  section without significant complaint.  Pain well controlled. Lochia normal.  Tolerating diet. Not yet ambulating. Herring in place.  Passing flatus. No BM.      Vitals:    Vitals:    24 1059   BP: 125/66   Pulse: 82   Resp: 18   Temp: 99 °F (37.2 °C)   SpO2: 97%      Temp (24hrs), Av °F (36.7 °C), Min:97.5 °F (36.4 °C), Max:99.3 °F (37.4 °C)      Exam:  Patient without distress.               CTAB, no w/r/r/c.               RRR, +S1 and S2, no m/r/g.    Abdomen soft, fundus firm at level of umbilicus.               Incision covered with bandage. Clean and dry. Appropriately tender.                Lower extremities:  No edema. No palpable cords or tenderness.    Lab/Data Review:  Recent Results (from the past 12 hour(s))   CBC    Collection Time: 24  6:30 AM   Result Value Ref Range    WBC 8.5 3.6 - 11.0 K/uL    RBC 3.79 (L) 3.80 - 5.20 M/uL    Hemoglobin 10.4 (L) 11.5 - 16.0 g/dL    Hematocrit 31.3 (L) 35.0 - 47.0 %    MCV 82.6 80.0 - 99.0 FL    MCH 27.4 26.0 - 34.0 PG    MCHC 33.2 30.0 - 36.5 g/dL    RDW 15.9 (H) 11.5 - 14.5 %    Platelets 301 150 - 400 K/uL    MPV 10.2 8.9 - 12.9 FL    Nucleated RBCs 0.0 0  WBC    nRBC 0.00 0.00 - 0.01 K/uL       Assessment and Plan:    Talya Hayes is a 39 y.o.  s/p primary LTCS at 39w2d indicated for breech presentation. Pregnancy was complicated by polyhydramnios, AMA and maternal obesity. Patient appears to be having an uncomplicated post-op course.      Continue routine incision care and maternal education.    Plan to discharge POD 3 if no problems occur.  Remove herring today, encourage ambulation and monitor for spontaneous voiding    Patient was seen and discussed with Dr. Gaona (attending physician).                 Kaylee Abreu MD  Family

## 2024-06-08 NOTE — LACTATION NOTE
This note was copied from a baby's chart.  Interpretor # [id number], Sean, for consult.   BF basics reviewed. Mothers questions answered.  Printed BF info given in [place].  Hand pump given and demonstrated use.    Discussed with mother her plan for feeding.  Reviewed the benefits of exclusive breast milk feeding during the hospital stay.  She acknowledges understanding of information reviewed and states that it is her plan to breastfeed and formula feed her infant.  Will support her choice and offer additional information as needed.     Reviewed breastfeeding basics:  How milk is made and normal  breastfeeding behaviors discussed.  Supply and demand,  stomach size, early feeding cues, skin to skin bonding with comfortable positioning and baby led latch-on reviewed.  How to identify signs of successful breastfeeding sessions reviewed; education on  normal  feeding frequency and duration and expected infant output discussed.   Breastfeeding Booklet and Warm line information provided with discussion.      Pt chooses to do both breast and bottle feeding, will follow recommendations to breastfeed first to help establish milk supply and only top off with formula, if needed, will be educated on potential consequences of early supplementation on breastfeeding success, but will be supported in decision to do both.       Pt will successfully establish breastfeeding by feeding in response to early feeding cues   or wake every 3h, will obtain deep latch, and will keep log of feedings/output.  Taught to BF at hunger cues and or q 2-3 hrs and to offer 10-20 drops of hand expressed colostrum at any non-feeds.      Left Breast: Soft  Left Nipple: Protrude  Right Nipple: Protrude  Right Breast: Soft               Formula Type: Similac 360 Total Care     Latch:  (baby asleep at breast)  Audible Swallowing: A few with stimulation  Type of Nipple: Everted (after stimulation)  Comfort (Breast/Nipple):

## 2024-06-09 VITALS
TEMPERATURE: 97.9 F | DIASTOLIC BLOOD PRESSURE: 74 MMHG | OXYGEN SATURATION: 98 % | HEART RATE: 71 BPM | RESPIRATION RATE: 15 BRPM | SYSTOLIC BLOOD PRESSURE: 117 MMHG

## 2024-06-09 PROCEDURE — 94761 N-INVAS EAR/PLS OXIMETRY MLT: CPT

## 2024-06-09 PROCEDURE — 6370000000 HC RX 637 (ALT 250 FOR IP)

## 2024-06-09 RX ORDER — PSEUDOEPHEDRINE HCL 30 MG
100 TABLET ORAL 2 TIMES DAILY
Qty: 30 CAPSULE | Refills: 0 | Status: SHIPPED | OUTPATIENT
Start: 2024-06-09

## 2024-06-09 RX ORDER — POLYETHYLENE GLYCOL 3350 17 G/17G
17 POWDER, FOR SOLUTION ORAL DAILY PRN
Qty: 510 G | Refills: 0 | Status: SHIPPED | OUTPATIENT
Start: 2024-06-09 | End: 2024-07-09

## 2024-06-09 RX ORDER — OXYCODONE HYDROCHLORIDE 5 MG/1
5 TABLET ORAL EVERY 4 HOURS PRN
Qty: 10 TABLET | Refills: 0 | Status: SHIPPED | OUTPATIENT
Start: 2024-06-09 | End: 2024-06-12

## 2024-06-09 RX ORDER — IBUPROFEN 800 MG/1
800 TABLET ORAL EVERY 8 HOURS
Qty: 60 TABLET | Refills: 0 | Status: SHIPPED | OUTPATIENT
Start: 2024-06-09

## 2024-06-09 RX ADMIN — ACETAMINOPHEN 1000 MG: 500 TABLET ORAL at 00:17

## 2024-06-09 RX ADMIN — OXYCODONE HYDROCHLORIDE 5 MG: 5 TABLET ORAL at 04:00

## 2024-06-09 RX ADMIN — Medication 1 TABLET: at 08:51

## 2024-06-09 RX ADMIN — IBUPROFEN 800 MG: 800 TABLET, FILM COATED ORAL at 04:00

## 2024-06-09 RX ADMIN — ACETAMINOPHEN 1000 MG: 500 TABLET ORAL at 08:51

## 2024-06-09 RX ADMIN — DOCUSATE SODIUM 100 MG: 100 CAPSULE, LIQUID FILLED ORAL at 08:51

## 2024-06-09 RX ADMIN — IBUPROFEN 800 MG: 800 TABLET, FILM COATED ORAL at 12:36

## 2024-06-09 RX ADMIN — FERROUS SULFATE TAB 325 MG (65 MG ELEMENTAL FE) 325 MG: 325 (65 FE) TAB at 08:51

## 2024-06-09 ASSESSMENT — PAIN DESCRIPTION - LOCATION
LOCATION: ABDOMEN
LOCATION: ABDOMEN;INCISION
LOCATION: ABDOMEN;INCISION

## 2024-06-09 ASSESSMENT — PAIN - FUNCTIONAL ASSESSMENT
PAIN_FUNCTIONAL_ASSESSMENT: ACTIVITIES ARE NOT PREVENTED
PAIN_FUNCTIONAL_ASSESSMENT: ACTIVITIES ARE NOT PREVENTED

## 2024-06-09 ASSESSMENT — PAIN DESCRIPTION - ORIENTATION: ORIENTATION: LOWER

## 2024-06-09 ASSESSMENT — PAIN DESCRIPTION - DESCRIPTORS
DESCRIPTORS: ACHING;SORE
DESCRIPTORS: ACHING
DESCRIPTORS: CRAMPING
DESCRIPTORS: CRAMPING

## 2024-06-09 ASSESSMENT — PAIN SCALES - GENERAL
PAINLEVEL_OUTOF10: 5
PAINLEVEL_OUTOF10: 4
PAINLEVEL_OUTOF10: 5
PAINLEVEL_OUTOF10: 5

## 2024-06-09 NOTE — DISCHARGE SUMMARY
Obstetrical Discharge Summary     Name: Talya Hayes MRN: [id number]  SSN: [id number]    YOB: 1985  Age: 39 y.o.  Sex: female      Admit Date: 2024    Discharge Date: 2024     Admitting Physician: Sybil Gaona DO     Attending Physician:  Sybil Gaona DO     Admission Diagnoses: 39 weeks gestation of pregnancy [Z3A.39]    Discharge Diagnoses:   Principal Problem:    39 weeks gestation of pregnancy  Resolved Problems:    * No resolved hospital problems. *       Immunization(s): There is no immunization history for the selected administration types on file for this patient.     Hospital Course:   Patient is a 39 y.o.  s/p primary LTCS at 39 weeks 2 days for breech presentation in setting of polyhydramnios, AMA, obesity.  Pregnancy complicated by polyhydramnios, AMA and maternal obesity. Delivered TLMI by without complications.  Normal hospital course following the delivery.  On day of discharge patient reported minimal lochia, well controlled pain, and no other complaints.  Discharged with pain regimen and bowel regimen.  Advised to continue prenatal vitamins.  Follow up with Dr. Gaona in 2 weeks.      Depression Scale  Postpartum Depression: High Risk (2024)    Aladdin  Depression Scale     Last EPDS Total Score: 11     Last EPDS Self Harm Result: Never       Follow up test at discharge: none  Condition at Discharge:  Stable  Disposition: Discharge to Home    Physical exam:  /74   Pulse 71   Temp 97.9 °F (36.6 °C) (Oral)   Resp 15   LMP 2023   SpO2 98%   Breastfeeding Unknown     Exam:  Patient without distress.               CTAB, no w/r/r/c               RRR, + S1 and S2, no m/r/g               Abdomen soft, fundus firm at level of umbilicus, non tender     Incision c/d/i, appropriately tender               Perineum with normal lochia noted.               Lower extremities are negative for swelling, cords or

## 2024-06-09 NOTE — PROGRESS NOTES
Pt discharged home. Discharge instructions and education completed using  services and patient reported she had no more questions. Bands verified on patients and infant, see footprint sheet. Infant placed in car seat by parent.     Prescriptions sent to pharmacy.

## 2024-06-09 NOTE — CARE COORDINATION
Case Management Note        CM received consult for [facility] information. CM arrived in room nursing and patient discussing discharge information, video  already in session. CM has provided patient with [facility] clinic locations using  service. Location also placed on patient's AVS. Patient expressed understanding.               ___________________________________________   Rain Gaspar RN Case Manager  6/9/2024   12:51 PM

## 2024-06-09 NOTE — DISCHARGE INSTRUCTIONS
de un nuevo bebé pueden causar melancolía después del parto. Si estos cambios anímicos coker más de 2 semanas, usted podría tener depresión posparto. Esta es cindy afección médica que requiere tratamiento.  Si usted presenta alguna de estas señales, podría tener depresión. Michaela a hall médico de inmediato.    Se siente muy basia o sin esperanzas y pierde interés en raj actividades cotidianas.       Duerme demasiado o no lo suficiente.       Siente cansancio o noe si no tuviera energía.       Come demasiado o demasiado poco.       Escribe o habla acerca de la muerte.     Dónde conseguir ayuda las 24 horas del día, 7 días a la semana   Si usted o alguien que conoce habla de suicidio, autolesionarse, cindy crisis de poli mental, cindy crisis por consumo de sustancias o cualquier otro tipo de angustia emocional, consiga ayuda de inmediato. Usted puede:   Marcar [phone] para llamar a la línea de prevención del suicidio y crisis.     Llamar al [phone] ([phone]).     Enviar un mensaje de texto que diga HOME al [phone] para acceder a la línea de mensajes de texto en casos de crisis.    Considere guardar estos números en hall teléfono.        Qué puede hacer   Trate de ir a todas raj sesiones de asesoría psicológica.  [place] los medicamentos según las indicaciones.  Coma alimentos saludables.  Katia ejercicio todos los khurram, noe salir a caminar.  Trate de recibir algo de britta solar todos los khurram.  Evite consumir alcohol u otras sustancias.  Descanse lo más posible.  Conéctese con amigos y únase a un tom de apoyo para nuevos padres.  ¿Cuándo debe pedir ayuda?   Llame al 911 si:    Siente que no puede evitar hacerse daño o lastimar a hall bebé o a otra persona.   Llame al médico ahora mismo o busque atención médica inmediata si:    Tiene problemas para cuidarse o para ocuparse de hall bebé.     Oye voces.   Comuníquese con hall médico si:    Tiene problemas con raj medicamentos.     No mejora noe se esperaba.   La atención de

## 2024-06-10 LAB — T PALLIDUM AB SER QL IA: NON REACTIVE

## (undated) DEVICE — INTENT OT USE PROVIDES A STERILE INTERFACE BETWEEN THE OPERATING ROOM SURGICAL LAMPS (NON-STERILE) AND THE SURGEON OR STAFF WORKING IN THE STERILE FIELD.: Brand: ASPEN® ALC PLUS LIGHT HANDLE COVER

## (undated) DEVICE — GOWN,SIRUS,FABRNF,XL,20/CS: Brand: MEDLINE

## (undated) DEVICE — SOLIDIFIER FLUID 3000 CC ABSORB

## (undated) DEVICE — SUTURE MONOCRYL SZ 0 L36IN ABSRB UD L36MM CT-1 1/2 CIR Y946H

## (undated) DEVICE — PENCIL ES L3M ROCK SWCH S STL HEX LOK BLDE ELECTRD HOLSTER

## (undated) DEVICE — APPLICATOR MEDICATED 26 CC SOLUTION HI LT ORNG CHLORAPREP

## (undated) DEVICE — TOWEL,OR,DSP,ST,BLUE,STD,2/PK,40PK/CS: Brand: MEDLINE

## (undated) DEVICE — SOLUTION IV 1000ML 0.9% SOD CHL

## (undated) DEVICE — STERILE POLYISOPRENE POWDER-FREE SURGICAL GLOVES: Brand: PROTEXIS

## (undated) DEVICE — TRAY,URINE METER,100% SILICONE,16FR10ML: Brand: MEDLINE

## (undated) DEVICE — Z DISCONTINUED USE 2220179 SUTURE VCRL SZ 0 L36IN ABSRB VLT L40MM CT 1/2 CIR J358H

## (undated) DEVICE — C-SECTION II-LF: Brand: MEDLINE INDUSTRIES, INC.

## (undated) DEVICE — ELECTRODE PT RET AD L9FT HI MOIST COND ADH HYDRGEL CORDED

## (undated) DEVICE — CLEANER ES TIP W2XL2IN ADH BK RADPQ FOR S STL ELECTRD

## (undated) DEVICE — SUTURE VICRYL + SZ 4-0 L27IN ABSRB UD KS STR REV CUT NDL VCP662H

## (undated) DEVICE — GARMENT,MEDLINE,DVT,INT,CALF,MED, GEN2: Brand: MEDLINE

## (undated) DEVICE — SUTURE MONOCRYL 2 0 L27IN ABSRB VLT CT MFIL Y351H

## (undated) DEVICE — STRAP,POSITIONING,KNEE/BODY,FOAM,4X60": Brand: MEDLINE

## (undated) DEVICE — 3000CC GUARDIAN II: Brand: GUARDIAN